# Patient Record
Sex: MALE | Race: WHITE | NOT HISPANIC OR LATINO | URBAN - METROPOLITAN AREA
[De-identification: names, ages, dates, MRNs, and addresses within clinical notes are randomized per-mention and may not be internally consistent; named-entity substitution may affect disease eponyms.]

---

## 2024-03-19 NOTE — PROGRESS NOTES
History Of Present Illness  HPI        Past medical history:  Laparoscopic cholecystectomy with cholangiogram for chronic cholecystitis on March 6, 2018.  Cholangiogram was normal.    Past Medical History  He has a past medical history of Personal history of other diseases of male genital organs.    Surgical History  He has no past surgical history on file.     Allergies  Patient has no allergy information on record.    Social History  He has no history on file for tobacco use, alcohol use, and drug use.    Family History  No family history on file.    Review of Systems   ***     Last Recorded Vitals  There were no vitals taken for this visit.    Physical Exam   ***    Relevant Results       Assessment/Plan   {Assess/PlanSmartLinks:10003}        West Dove MD

## 2024-03-22 ENCOUNTER — APPOINTMENT (OUTPATIENT)
Dept: SURGERY | Facility: CLINIC | Age: 34
End: 2024-03-22

## 2024-03-25 ENCOUNTER — OFFICE VISIT (OUTPATIENT)
Dept: SURGERY | Facility: CLINIC | Age: 34
End: 2024-03-25
Payer: MEDICAID

## 2024-03-25 VITALS
SYSTOLIC BLOOD PRESSURE: 160 MMHG | WEIGHT: 227.6 LBS | OXYGEN SATURATION: 95 % | HEART RATE: 104 BPM | BODY MASS INDEX: 40.33 KG/M2 | HEIGHT: 63 IN | RESPIRATION RATE: 16 BRPM | TEMPERATURE: 97.6 F | DIASTOLIC BLOOD PRESSURE: 104 MMHG

## 2024-03-25 DIAGNOSIS — I10 HYPERTENSION, UNSPECIFIED TYPE: ICD-10-CM

## 2024-03-25 DIAGNOSIS — R10.11 RIGHT UPPER QUADRANT ABDOMINAL PAIN: Primary | ICD-10-CM

## 2024-03-25 PROCEDURE — 3077F SYST BP >= 140 MM HG: CPT | Performed by: SURGERY

## 2024-03-25 PROCEDURE — 99203 OFFICE O/P NEW LOW 30 MIN: CPT | Performed by: SURGERY

## 2024-03-25 PROCEDURE — 3080F DIAST BP >= 90 MM HG: CPT | Performed by: SURGERY

## 2024-03-25 NOTE — LETTER
March 25, 2024     Prabhu Kirby DO  [address]  [place]  [facility] [address]    Patient: Kapil Bernabe   YOB: 1990   Date of Visit: 3/25/2024       Dear Dr. Prabhu Kirby DO:    Thank you for referring Kapil Bernabe to me for evaluation. Below are my notes for this consultation.  If you have questions, please do not hesitate to call me. I look forward to following your patient along with you.       Sincerely,     West Dove MD      CC: No Recipients  ______________________________________________________________________________________    History Of Present Illness  HPI   The patient is a 33-year-old male who complains of a 2-year history of intermittent episodes of right upper quadrant abdominal pain which has worsened over the past 5 months.  The pain typically occurs once every couple weeks and lasts about 30 seconds, but the area remains sore for a day or 2 after the episode.  He feels the symptoms may be caused by movement such as leaning forward or laughing, but also may occur if he does not chew his food well.  The symptoms improved if he massages the area.  The pain does not radiate anywhere and he has no back pain.  He has a good appetite without nausea or vomiting.  No weight loss.  No fevers or chills.  No jaundice.  No blood in his stool.  He reports having loose bowel movements in the morning since his cholecystectomy.    Past medical history:  Laparoscopic cholecystectomy with cholangiogram for chronic cholecystitis on March 6, 2018.  Cholangiogram was normal.  Hypertension.  He is not taking any medication for the hypertension.    Tobacco: He quit smoking 1 month ago    Past Medical History  He has a past medical history of Personal history of other diseases of male genital organs.    Surgical History  He has a past surgical history that includes Cholecystectomy.     Allergies  Patient has no known allergies.    Social History  He reports that he quit smoking about 4 weeks ago.  "His smoking use included cigarettes. He uses smokeless tobacco. He reports that he does not currently use alcohol. He reports current drug use. Drug: Marijuana.    Family History  No family history on file.    Review of Systems  Review of Systems:  Constitutional:  no fever, no chills, no significant weight change  Neurological: No history of CVA or seizure disorder  Eyes: No pain, no recent visual change  ENT:  No recent hearing loss  Neck: No pain  Cardiovascular: No chest pain, no history of cardiac disease such as myocardial infarction or arrhythmia or congestive heart failure  Pulmonary: No shortness of breath, no history of pulmonary disease such as pneumonia or COPD  Breast: No history of breast disease or breast mass  Gastrointestinal:   As above.  No nausea or vomiting, no constipation or diarrhea or blood in the stool.  No history of ulcers.  No liver or pancreas disease.  No intestinal disorder.  Genitourinary: No hematuria or dysuria, no kidney disease  Musculoskeletal:  no arthralgia, no muscle or bone pain  Integumentary:  no rash  Psychiatric:  No anxiety or depression  Endocrine:  no history of diabetes  Hematologic/Lymphatic: No easy bruising or bleeding    Last Recorded Vitals  Blood pressure (!) 160/104, pulse 104, temperature 36.4 °C (97.6 °F), temperature source Temporal, resp. rate 16, height 1.6 m (5' 3\"), weight 103 kg (227 lb 9.6 oz), SpO2 95 %.    Physical Exam  Constitutional: Well-developed, well-nourished, alert and oriented, no acute distress  Skin: Warm and dry, no lesions, no rashes, no jaundice  HEENT: Normocephalic, atraumatic, EOMI, no scleral icterus, eyes have no redness or swelling or discharge, external inspection of ears and nose is normal, mucous membranes moist  Neck: Soft, nontender, no mass or adenopathy  Cardiac: Regular rate and rhythm, no murmur  Chest: Patent airway, clear to auscultation, normal breath sounds with good chest expansion, no wheezes or rales or rhonchi " noted, thorax symmetric  Abdomen: Nondistended, laparoscopy scars, positive bowel sounds, soft, nontender, no mass  Rectal: Not performed  Extremities: No injury, no lower extremity edema or calf tenderness  Lymphatic: No cervical adenopathy  Musculoskeletal: Range of motion intact, no joint swelling, normal strength  Neurological: Alert and oriented x3, intact sensory and motor function, no obvious focal neurologic abnormalities, normal gait  Psychological: Appropriate mood and behavior       Assessment/Plan  Diagnoses and all orders for this visit:  Right upper quadrant abdominal pain  -     CBC; Future  -     Comprehensive Metabolic Panel; Future  -     Lipase; Future  Hypertension, unspecified type    Uncertain etiology for the patient's intermittent right upper quadrant abdominal pain.  Examination unremarkable.  The pain may be musculoskeletal in origin.  Order CBC, CMP, lipase for further evaluation.  I told the patient to also follow-up with his primary care physician for additional evaluation of the abdominal pain and hypertension.  Follow-up with me after labs are completed and evaluation by PCP.    West Dove MD